# Patient Record
Sex: FEMALE | Race: WHITE | Employment: FULL TIME | URBAN - METROPOLITAN AREA
[De-identification: names, ages, dates, MRNs, and addresses within clinical notes are randomized per-mention and may not be internally consistent; named-entity substitution may affect disease eponyms.]

---

## 2018-12-05 ENCOUNTER — OFFICE VISIT (OUTPATIENT)
Dept: ORTHOPEDIC SURGERY | Age: 73
End: 2018-12-05
Payer: COMMERCIAL

## 2018-12-05 VITALS — WEIGHT: 197 LBS | HEIGHT: 64 IN | BODY MASS INDEX: 33.63 KG/M2

## 2018-12-05 DIAGNOSIS — S62.337A CLOSED DISPLACED FRACTURE OF NECK OF FIFTH METACARPAL BONE OF LEFT HAND, INITIAL ENCOUNTER: Primary | ICD-10-CM

## 2018-12-05 PROCEDURE — 26600 TREAT METACARPAL FRACTURE: CPT | Performed by: ORTHOPAEDIC SURGERY

## 2018-12-05 PROCEDURE — 99024 POSTOP FOLLOW-UP VISIT: CPT | Performed by: ORTHOPAEDIC SURGERY

## 2018-12-05 RX ORDER — ACETAMINOPHEN AND CODEINE PHOSPHATE 300; 30 MG/1; MG/1
1 TABLET ORAL EVERY 4 HOURS PRN
Qty: 40 TABLET | Refills: 0 | Status: SHIPPED | OUTPATIENT
Start: 2018-12-05 | End: 2018-12-26

## 2018-12-05 NOTE — PROGRESS NOTES
Injury (left ) and Fall          Current Outpatient Prescriptions:     mometasone-formoterol (DULERA) 200-5 MCG/ACT inhaler, Inhale 2 puffs into the lungs every 12 hours. , Disp: , Rfl:     HYDROcodone-acetaminophen (NORCO) 5-325 MG per tablet, 1-2 tablets every 4-6 hours PRN pain, Disp: 40 tablet, Rfl: 0    rivaroxaban (XARELTO) 10 MG TABS tablet, Take 1 tablet by mouth daily. , Disp: 12 tablet, Rfl: 0    FUROSEMIDE PO, Take 20 mg by mouth 2 times daily. , Disp: , Rfl:     AZATHIOPRINE PO, Take 25 mg by mouth daily. , Disp: , Rfl:     SPIRONOLACTONE PO, Take 25 mg by mouth daily. , Disp: , Rfl:     levothyroxine (SYNTHROID) 88 MCG tablet, Take 88 mcg by mouth Daily. , Disp: , Rfl:     atorvastatin (LIPITOR) 10 MG tablet, Take 10 mg by mouth nightly., Disp: , Rfl:     albuterol-ipratropium (COMBIVENT)  MCG/ACT inhaler, Inhale 2 puffs into the lungs 4 times daily. , Disp: , Rfl:     budesonide-formoterol (SYMBICORT) 160-4.5 MCG/ACT AERO, Inhale 2 puffs into the lungs 2 times daily. , Disp: , Rfl:     ibuprofen (ADVIL;MOTRIN) 200 MG tablet, Take 400 mg by mouth every 6 hours as needed for Pain., Disp: , Rfl:     Calcium Citrate-Vitamin D (CALCIUM CITRATE + D3 PO), Take 600 mg by mouth 2 times daily. , Disp: , Rfl:     fluticasone (VERAMYST) 27.5 MCG/SPRAY nasal spray, 2 sprays by Nasal route nightly., Disp: , Rfl:     sodium chloride (OCEAN, BABY AYR) 0.65 % nasal spray, 2 sprays by Nasal route 2 times daily. , Disp: , Rfl:     Jsvpmu-NuLkj-WgUfcg-FA-Omega (MULTIVITAMIN/MINERALS PO), Take 1 tablet by mouth daily. [facility] women[facility] over 55, Disp: , Rfl:     POTASSIUM GLUCONATE PO, Take 99 mg by mouth nightly., Disp: , Rfl:     No Known Allergies    Social History     Social History    Marital status:      Spouse name: N/A    Number of children: N/A    Years of education: N/A     Occupational History    Not on file.      Social History Main Topics    Smoking status: Never Smoker    Smokeless

## 2019-01-02 ENCOUNTER — OFFICE VISIT (OUTPATIENT)
Dept: ORTHOPEDIC SURGERY | Age: 74
End: 2019-01-02

## 2019-01-02 DIAGNOSIS — S62.309A: Primary | ICD-10-CM

## 2019-01-02 DIAGNOSIS — Z46.89 CAST REMOVAL: ICD-10-CM

## 2019-01-02 PROCEDURE — 99024 POSTOP FOLLOW-UP VISIT: CPT | Performed by: ORTHOPAEDIC SURGERY

## 2019-01-30 ENCOUNTER — OFFICE VISIT (OUTPATIENT)
Dept: ORTHOPEDIC SURGERY | Age: 74
End: 2019-01-30

## 2019-01-30 VITALS — RESPIRATION RATE: 16 BRPM | HEART RATE: 78 BPM

## 2019-01-30 DIAGNOSIS — M79.642 LEFT HAND PAIN: Primary | ICD-10-CM

## 2019-01-30 DIAGNOSIS — S62.92XD: ICD-10-CM

## 2019-01-30 PROCEDURE — 99024 POSTOP FOLLOW-UP VISIT: CPT | Performed by: ORTHOPAEDIC SURGERY
